# Patient Record
Sex: MALE | Race: WHITE | Employment: FULL TIME | URBAN - METROPOLITAN AREA
[De-identification: names, ages, dates, MRNs, and addresses within clinical notes are randomized per-mention and may not be internally consistent; named-entity substitution may affect disease eponyms.]

---

## 2017-12-12 ENCOUNTER — APPOINTMENT (OUTPATIENT)
Dept: GENERAL RADIOLOGY | Facility: CLINIC | Age: 37
End: 2017-12-12
Payer: COMMERCIAL

## 2017-12-12 ENCOUNTER — HOSPITAL ENCOUNTER (EMERGENCY)
Facility: CLINIC | Age: 37
Discharge: HOME | End: 2017-12-12
Attending: EMERGENCY MEDICINE
Payer: COMMERCIAL

## 2017-12-12 ENCOUNTER — APPOINTMENT (OUTPATIENT)
Dept: CT IMAGING | Facility: CLINIC | Age: 37
End: 2017-12-12
Payer: COMMERCIAL

## 2017-12-12 VITALS
OXYGEN SATURATION: 99 % | BODY MASS INDEX: 22.35 KG/M2 | RESPIRATION RATE: 15 BRPM | HEART RATE: 79 BPM | DIASTOLIC BLOOD PRESSURE: 81 MMHG | HEIGHT: 72 IN | SYSTOLIC BLOOD PRESSURE: 124 MMHG | TEMPERATURE: 98.1 F | WEIGHT: 165 LBS

## 2017-12-12 DIAGNOSIS — R10.30 LOWER ABDOMINAL PAIN: Primary | ICD-10-CM

## 2017-12-12 LAB
-: ABNORMAL
ABSOLUTE EOS #: 0.2 K/UL (ref 0–0.4)
ABSOLUTE IMMATURE GRANULOCYTE: NORMAL K/UL (ref 0–0.3)
ABSOLUTE LYMPH #: 2.8 K/UL (ref 1–4.8)
ABSOLUTE MONO #: 0.6 K/UL (ref 0.1–1.2)
AMORPHOUS: ABNORMAL
BACTERIA: ABNORMAL
BASOPHILS # BLD: 1 % (ref 0–2)
BASOPHILS ABSOLUTE: 0.1 K/UL (ref 0–0.2)
BILIRUBIN URINE: NEGATIVE
CASTS UA: ABNORMAL /LPF (ref 0–2)
COLOR: YELLOW
COMMENT UA: ABNORMAL
CRYSTALS, UA: ABNORMAL /HPF
DIFFERENTIAL TYPE: NORMAL
EOSINOPHILS RELATIVE PERCENT: 2 % (ref 1–4)
EPITHELIAL CELLS UA: ABNORMAL /HPF (ref 0–5)
GLUCOSE URINE: NEGATIVE
HCT VFR BLD CALC: 47 % (ref 41–53)
HEMOGLOBIN: 16.1 G/DL (ref 13.5–17.5)
IMMATURE GRANULOCYTES: NORMAL %
KETONES, URINE: NEGATIVE
LEUKOCYTE ESTERASE, URINE: NEGATIVE
LYMPHOCYTES # BLD: 34 % (ref 24–44)
MCH RBC QN AUTO: 31.6 PG (ref 26–34)
MCHC RBC AUTO-ENTMCNC: 34.4 G/DL (ref 31–37)
MCV RBC AUTO: 92 FL (ref 80–100)
MONOCYTES # BLD: 7 % (ref 2–11)
MUCUS: ABNORMAL
NITRITE, URINE: NEGATIVE
OTHER OBSERVATIONS UA: ABNORMAL
PDW BLD-RTO: 12.9 % (ref 12.5–15.4)
PH UA: 5.5 (ref 5–8)
PLATELET # BLD: 225 K/UL (ref 140–450)
PLATELET ESTIMATE: NORMAL
PMV BLD AUTO: 8.7 FL (ref 6–12)
PROTEIN UA: NEGATIVE
RBC # BLD: 5.11 M/UL (ref 4.5–5.9)
RBC # BLD: NORMAL 10*6/UL
RBC UA: ABNORMAL /HPF (ref 0–2)
RENAL EPITHELIAL, UA: ABNORMAL /HPF
SEG NEUTROPHILS: 56 % (ref 36–66)
SEGMENTED NEUTROPHILS ABSOLUTE COUNT: 4.6 K/UL (ref 1.8–7.7)
SPECIFIC GRAVITY UA: 1.02 (ref 1–1.03)
TRICHOMONAS: ABNORMAL
TURBIDITY: CLEAR
URINE HGB: ABNORMAL
UROBILINOGEN, URINE: NORMAL
WBC # BLD: 8.2 K/UL (ref 3.5–11)
WBC # BLD: NORMAL 10*3/UL
WBC UA: ABNORMAL /HPF (ref 0–5)
YEAST: ABNORMAL

## 2017-12-12 PROCEDURE — 6360000004 HC RX CONTRAST MEDICATION: Performed by: EMERGENCY MEDICINE

## 2017-12-12 PROCEDURE — 74177 CT ABD & PELVIS W/CONTRAST: CPT

## 2017-12-12 PROCEDURE — 85025 COMPLETE CBC W/AUTO DIFF WBC: CPT

## 2017-12-12 PROCEDURE — 2580000003 HC RX 258: Performed by: EMERGENCY MEDICINE

## 2017-12-12 PROCEDURE — 74022 RADEX COMPL AQT ABD SERIES: CPT

## 2017-12-12 PROCEDURE — 81001 URINALYSIS AUTO W/SCOPE: CPT

## 2017-12-12 PROCEDURE — 85014 HEMATOCRIT: CPT

## 2017-12-12 PROCEDURE — 80047 BASIC METABLC PNL IONIZED CA: CPT

## 2017-12-12 PROCEDURE — 36415 COLL VENOUS BLD VENIPUNCTURE: CPT

## 2017-12-12 PROCEDURE — 99284 EMERGENCY DEPT VISIT MOD MDM: CPT

## 2017-12-12 RX ORDER — 0.9 % SODIUM CHLORIDE 0.9 %
70 INTRAVENOUS SOLUTION INTRAVENOUS ONCE
Status: COMPLETED | OUTPATIENT
Start: 2017-12-12 | End: 2017-12-12

## 2017-12-12 RX ORDER — IBUPROFEN 800 MG/1
800 TABLET ORAL EVERY 8 HOURS PRN
Qty: 30 TABLET | Refills: 0 | Status: SHIPPED | OUTPATIENT
Start: 2017-12-12

## 2017-12-12 RX ORDER — SODIUM CHLORIDE 0.9 % (FLUSH) 0.9 %
10 SYRINGE (ML) INJECTION PRN
Status: DISCONTINUED | OUTPATIENT
Start: 2017-12-12 | End: 2017-12-12 | Stop reason: HOSPADM

## 2017-12-12 RX ADMIN — SODIUM CHLORIDE 70 ML: 9 INJECTION, SOLUTION INTRAVENOUS at 12:48

## 2017-12-12 RX ADMIN — Medication 10 ML: at 12:48

## 2017-12-12 RX ADMIN — IOPAMIDOL 80 ML: 755 INJECTION, SOLUTION INTRAVENOUS at 12:48

## 2017-12-12 ASSESSMENT — PAIN DESCRIPTION - ORIENTATION: ORIENTATION: LOWER;RIGHT;LEFT

## 2017-12-12 ASSESSMENT — ENCOUNTER SYMPTOMS
SHORTNESS OF BREATH: 0
DIARRHEA: 0
WHEEZING: 0
TROUBLE SWALLOWING: 0
ABDOMINAL DISTENTION: 1
NAUSEA: 1
VOMITING: 0
ABDOMINAL PAIN: 1
SORE THROAT: 0
CONSTIPATION: 1
BLOOD IN STOOL: 0
BACK PAIN: 1

## 2017-12-12 ASSESSMENT — PAIN SCALES - GENERAL: PAINLEVEL_OUTOF10: 8

## 2017-12-12 ASSESSMENT — PAIN DESCRIPTION - DESCRIPTORS: DESCRIPTORS: PRESSURE;THROBBING;SHOOTING

## 2017-12-12 ASSESSMENT — PAIN DESCRIPTION - FREQUENCY: FREQUENCY: CONTINUOUS

## 2017-12-12 ASSESSMENT — PAIN DESCRIPTION - PROGRESSION: CLINICAL_PROGRESSION: GRADUALLY WORSENING

## 2017-12-12 ASSESSMENT — PAIN DESCRIPTION - LOCATION: LOCATION: ABDOMEN

## 2017-12-12 ASSESSMENT — PAIN DESCRIPTION - ONSET: ONSET: SUDDEN

## 2017-12-12 ASSESSMENT — PAIN DESCRIPTION - PAIN TYPE: TYPE: ACUTE PAIN

## 2017-12-12 NOTE — ED NOTES
Brett Whitley MD at bedside updating pt on results and plan of care.          Kimberlee Salinas RN  12/12/17 7241

## 2017-12-12 NOTE — ED NOTES
Pt arrives to ER with c/o lower abdominal pain and back pain. He states that he has had \"small hard bowel movement,\" but hasn't had a normal one in about 1 week. He states that he has been passing gas. Pt c/o chronic back and hip pain, which is being treated by his family dr. Skyler Patel c/o nausea at times. Pt alert and oriented upon arrival.  Pt ambulates with steady gait. Comfort offered, no concerns no s/s of distress.       Marques Ross RN  12/12/17 4901

## 2017-12-12 NOTE — ED PROVIDER NOTES
[facility]  [address] [facility] [address]  Phone: [phone]        Pt Name: Sarah Millan  MRN: [id number]  [place] 1980  Date of evaluation: 12/12/17      CHIEF COMPLAINT       Chief Complaint   Patient presents with    Abdominal Pain    Back Pain    Flank Pain         HISTORY OF PRESENT ILLNESS    Sarah Millan is a 40 y.o. male who presents With complaints of back pain and flank pain and low abdominal pain. Patient says it's been going on almost a week he has not had any recent bowel movements in a week he states passed some small hard stools he's been passing a lot of gas and that seems to relieve the pain a little bit he has had chronic back and hip pain and it's, flaring up as well he's not taking any narcotics currently for this is a little nauseated on occasion but not nauseated currently      REVIEW OF SYSTEMS         Review of Systems   Constitutional: Negative for chills and fever. HENT: Negative for congestion, dental problem, sore throat and trouble swallowing. Eyes: Negative for visual disturbance. Respiratory: Negative for shortness of breath and wheezing. Cardiovascular: Negative for chest pain, palpitations and leg swelling. Gastrointestinal: Positive for abdominal distention, abdominal pain, constipation and nausea. Negative for blood in stool, diarrhea and vomiting. Genitourinary: Positive for flank pain. Negative for difficulty urinating, dysuria and testicular pain. Musculoskeletal: Positive for arthralgias and back pain. Negative for joint swelling and neck pain. Patient is chronic back and hip pain no new injury   Skin: Negative for rash. Neurological: Negative for dizziness, syncope, weakness and headaches. Hematological: Negative for adenopathy. Does not bruise/bleed easily. Psychiatric/Behavioral: Negative for confusion and suicidal ideas. PAST MEDICAL HISTORY    has a past medical history of Arthritis and Colitis.     SURGICAL HISTORY      has a past surgical history that includes Knee arthroscopy. CURRENT MEDICATIONS       Previous Medications    No medications on file       ALLERGIES     has No Known Allergies. FAMILY HISTORY     has no family status information on file. family history is not on file. SOCIAL HISTORY      reports that he has been smoking Cigarettes. He has a 20.00 pack-year smoking history. He has never used smokeless tobacco. He reports that he uses drugs, including Marijuana, about 3 times per week. He reports that he does not drink alcohol. PHYSICAL EXAM     INITIAL VITALS:  height is 6' (1.829 m) and weight is 74.8 kg (165 lb). His oral temperature is 98.1 °F (36.7 °C). His blood pressure is 141/93 (abnormal) and his pulse is 79. His respiration is 15 and oxygen saturation is 99%. Physical Exam   Constitutional: He is oriented to person, place, and time. He appears well-developed and well-nourished. No distress. HENT:   Head: Normocephalic and atraumatic. Eyes: EOM are normal. Pupils are equal, round, and reactive to light. Neck: Normal range of motion. Neck supple. Cardiovascular: Normal rate and regular rhythm. Pulmonary/Chest: Effort normal and breath sounds normal.   Abdominal: Soft. Bowel sounds are normal. He exhibits no mass. There is tenderness. There is no rebound and no guarding. Patient's tender to lower abdomen there is no obvious masses no peritoneal signs bowel sounds are normal active   Musculoskeletal: Normal range of motion. Neurological: He is alert and oriented to person, place, and time. Skin: Skin is warm and dry. He is not diaphoretic. Psychiatric: He has a normal mood and affect.  His behavior is normal.       DIFFERENTIAL DIAGNOSIS/ MDM:     Abdominal pain and constipation    DIAGNOSTIC RESULTS     EKG: All EKG's are interpreted by the Emergency Department Physician who either signs or Co-signs this chart in the absence of a cardiologist.      RADIOLOGY:   Non-plain film images such as CT, Ultrasound and MRI are read by the radiologist. Plain radiographic images are visualized and the radiologist interpretations are reviewed as follows:        EXAMINATION:   ACUTE ABDOMINAL SERIES WITH SINGLE  VIEW OF THE CHEST       12/12/2017 11:37 am       COMPARISON:   None.       HISTORY:   ORDERING SYSTEM PROVIDED HISTORY: abdominal pain   TECHNOLOGIST PROVIDED HISTORY:   Reason for exam:->abdominal pain   Ordering Physician Provided Reason for Exam: generalized lower abdominal and   back pain   Acuity: Acute   Type of Exam: Initial   Additional signs and symptoms: nausea   Relevant Medical/Surgical History: colitis and current everyday smoker       FINDINGS:   Chest: Cardiomediastinal silhouette and pulmonary vasculature are within   normal limits.  Lungs are hyperinflated.  No focal airspace consolidation,   pneumothorax, or pleural effusion.  No free air beneath the diaphragm.  No   evidence of acute osseous abnormality.       Abdomen: Sharin Ped is a nonspecific, nonobstructed bowel gas pattern.  No free   air.  No abnormal calcific densities are identified overlying the kidneys or   course of the ureters.  Phleboliths are noted in the pelvis.  No evidence of   acute osseous abnormality.           Impression   1.  No acute intrathoracic process.       2.  No evidence of bowel obstruction or perforation.                EXAMINATION:   CT OF THE ABDOMEN AND PELVIS WITH CONTRAST 12/12/2017 12:54 pm       TECHNIQUE:   CT of the abdomen and pelvis was performed with the administration of   intravenous contrast. Multiplanar reformatted images are provided for review.    Dose modulation, iterative reconstruction, and/or weight based adjustment of   the mA/kV was utilized to reduce the radiation dose to as low as reasonably   achievable.       COMPARISON:   None.       HISTORY:   ORDERING SYSTEM PROVIDED HISTORY: lower abdominal pain   TECHNOLOGIST PROVIDED -------------------------  BP: (!) 141/93 (Pt asymptomatic), Temp: 98.1 °F (36.7 °C), Pulse: 79, Resp: 15          CONSULTS:      PROCEDURES:  None    FINAL IMPRESSION      1. Lower abdominal pain          DISPOSITION/PLAN   Discharge in stable condition    PATIENT REFERRED TO:  Kelly Byrd MD  [address] [place] [address]  [phone]    Schedule an appointment as soon as possible for a visit in 3 days        DISCHARGE MEDICATIONS:  New Prescriptions    IBUPROFEN (ADVIL;MOTRIN) 800 MG TABLET    Take 1 tablet by mouth every 8 hours as needed for Pain       (Please note that portions of this note were completed with a voice recognition program.  Efforts were made to edit the dictations but occasionally words are mis-transcribed.)    Ashley MD, F.A.A.E.M.   Attending Emergency Medicine Physician        Zee Palacio MD  12/12/17 4320

## 2017-12-12 NOTE — ED NOTES
Ryan Olmstead MD at bedside updating pt on results and plan of care.          Joyce Macias RN  12/12/17 0269

## 2017-12-13 LAB
ANION GAP: 16 MMOL/L (ref 8–16)
GLUCOSE BLD-MCNC: 153 MG/DL (ref 74–106)
POC BUN: 15 MG/DL (ref 6–20)
POC CHLORIDE: 104 MMOL/L (ref 98–110)
POC CREATININE: 1 MG/DL (ref 0.6–1.4)
POC HEMATOCRIT: <15 % (ref 41–53)
POC HEMOGLOBIN: ABNORMAL G/DL (ref 13.5–17.5)
POC IONIZED CALCIUM: 1.09 MMOL/L (ref 1.13–1.33)
POC POTASSIUM: 3.4 MMOL/L (ref 3.5–5.1)
POC SODIUM: 140 MMOL/L (ref 136–145)
POC TCO2: 23 MMOL/L (ref 20–31)